# Patient Record
Sex: MALE | Race: WHITE | NOT HISPANIC OR LATINO | Employment: FULL TIME | URBAN - METROPOLITAN AREA
[De-identification: names, ages, dates, MRNs, and addresses within clinical notes are randomized per-mention and may not be internally consistent; named-entity substitution may affect disease eponyms.]

---

## 2017-01-04 ENCOUNTER — HOSPITAL ENCOUNTER (OUTPATIENT)
Facility: MEDICAL CENTER | Age: 37
End: 2017-01-04
Attending: NURSE PRACTITIONER
Payer: MEDICAID

## 2017-01-04 ENCOUNTER — OFFICE VISIT (OUTPATIENT)
Dept: MEDICAL GROUP | Facility: MEDICAL CENTER | Age: 37
End: 2017-01-04
Attending: NURSE PRACTITIONER
Payer: MEDICAID

## 2017-01-04 VITALS
RESPIRATION RATE: 20 BRPM | WEIGHT: 171 LBS | TEMPERATURE: 98.6 F | OXYGEN SATURATION: 95 % | HEIGHT: 70 IN | BODY MASS INDEX: 24.48 KG/M2 | HEART RATE: 68 BPM | DIASTOLIC BLOOD PRESSURE: 70 MMHG | SYSTOLIC BLOOD PRESSURE: 110 MMHG

## 2017-01-04 DIAGNOSIS — H61.22 IMPACTED CERUMEN OF LEFT EAR: ICD-10-CM

## 2017-01-04 DIAGNOSIS — R30.0 DYSURIA: ICD-10-CM

## 2017-01-04 DIAGNOSIS — R51.9 NONINTRACTABLE EPISODIC HEADACHE, UNSPECIFIED HEADACHE TYPE: ICD-10-CM

## 2017-01-04 DIAGNOSIS — D64.9 NORMOCYTIC ANEMIA: ICD-10-CM

## 2017-01-04 DIAGNOSIS — N12 PYELONEPHRITIS: ICD-10-CM

## 2017-01-04 DIAGNOSIS — Z13.21 ENCOUNTER FOR VITAMIN DEFICIENCY SCREENING: ICD-10-CM

## 2017-01-04 DIAGNOSIS — Z87.442 HISTORY OF KIDNEY STONES: ICD-10-CM

## 2017-01-04 DIAGNOSIS — Z13.29 SCREENING FOR THYROID DISORDER: ICD-10-CM

## 2017-01-04 DIAGNOSIS — Z13.220 SCREENING CHOLESTEROL LEVEL: ICD-10-CM

## 2017-01-04 DIAGNOSIS — Z71.6 ENCOUNTER FOR TOBACCO USE CESSATION COUNSELING: ICD-10-CM

## 2017-01-04 LAB
APPEARANCE UR: CLEAR
APPEARANCE UR: CLEAR
BILIRUB UR QL STRIP.AUTO: NEGATIVE
BILIRUB UR STRIP-MCNC: NEGATIVE MG/DL
COLOR UR AUTO: NORMAL
COLOR UR AUTO: YELLOW
CULTURE IF INDICATED INDCX: NO UA CULTURE
GLUCOSE UR STRIP.AUTO-MCNC: NEGATIVE MG/DL
GLUCOSE UR STRIP.AUTO-MCNC: NEGATIVE MG/DL
KETONES UR STRIP.AUTO-MCNC: NEGATIVE MG/DL
KETONES UR STRIP.AUTO-MCNC: NEGATIVE MG/DL
LEUKOCYTE ESTERASE UR QL STRIP.AUTO: NEGATIVE
LEUKOCYTE ESTERASE UR QL STRIP.AUTO: NEGATIVE
MICRO URNS: NORMAL
NITRITE UR QL STRIP.AUTO: NEGATIVE
NITRITE UR QL STRIP.AUTO: NEGATIVE
PH UR STRIP.AUTO: 5 [PH] (ref 5–8)
PH UR: 5.5 [PH]
PROT UR QL STRIP: NEGATIVE MG/DL
PROT UR QL STRIP: NEGATIVE MG/DL
RBC UR QL AUTO: NEGATIVE
RBC UR QL AUTO: NEGATIVE
SP GR UR STRIP.AUTO: 1.01
SP GR UR STRIP.AUTO: 1.03
UROBILINOGEN UR STRIP-MCNC: NEGATIVE MG/DL

## 2017-01-04 PROCEDURE — 99204 OFFICE O/P NEW MOD 45 MIN: CPT | Mod: 25 | Performed by: NURSE PRACTITIONER

## 2017-01-04 PROCEDURE — 87491 CHLMYD TRACH DNA AMP PROBE: CPT

## 2017-01-04 PROCEDURE — 99204 OFFICE O/P NEW MOD 45 MIN: CPT | Performed by: NURSE PRACTITIONER

## 2017-01-04 PROCEDURE — 81003 URINALYSIS AUTO W/O SCOPE: CPT

## 2017-01-04 PROCEDURE — 87591 N.GONORRHOEAE DNA AMP PROB: CPT

## 2017-01-04 PROCEDURE — 81002 URINALYSIS NONAUTO W/O SCOPE: CPT | Performed by: NURSE PRACTITIONER

## 2017-01-04 RX ORDER — BUTALBITAL, ACETAMINOPHEN AND CAFFEINE 50; 325; 40 MG/1; MG/1; MG/1
1-2 TABLET ORAL EVERY 4 HOURS PRN
Qty: 10 TAB | Refills: 0 | Status: SHIPPED | OUTPATIENT
Start: 2017-01-04

## 2017-01-04 ASSESSMENT — PATIENT HEALTH QUESTIONNAIRE - PHQ9: CLINICAL INTERPRETATION OF PHQ2 SCORE: 0

## 2017-01-04 NOTE — ASSESSMENT & PLAN NOTE
Intermittent headaches right sided. Better since leaving hospital.  Fiorcet in hospital helped.  Denies neurological deficits.  CT head in hospital in Dec normal.

## 2017-01-04 NOTE — ASSESSMENT & PLAN NOTE
"Pt reports RUQ and right flank pain intermittent.  Feels similar to previous kidney infection.  Pt w recent Pyelonephritis in hospital in Dec.  Denies fever or chills.  States sometimes gets \"hot flash\".  Denies difficulty urinating. Pt denies known blood in urine or fowl smell. Does report intermittent RUQ and right flank pains.  Denies n/v or feeling ill.  Slight irritation with voiding.  Denies penile discharge.  We discussed referral to Urologist and we reviewed his last CT Abd/Pelvis and Oct 2016 Renal CT Scan.    "

## 2017-01-04 NOTE — ASSESSMENT & PLAN NOTE
Pt has ear wax in left ear  Has had this issue in past.  Reports would like it irrigated out in near future.  Denies pain to ear or drainage.

## 2017-01-04 NOTE — PROGRESS NOTES
"    Chief Complaint: New Patient and recent kidney problems    HPI:  Gage presents to the clinic to establish as a New Patient.    His PMH includes:  Tobacco Use  Headaches  Kidney Stone (probable)    Review of Records shows  12/16---> 12/19/16 Hospital Admit for Pyelonephritis/Sepsis, hypokalemia, Headaches  CT Abd Pelvis showed:   1.  Patchy hypodensities in the right kidney with renal enlargement and significant perinephric stranding. Findings are suggestive of pyelonephritis, but CT appears similar to the prior exam in October. Infiltrative neoplasm is less likely in a patient   of this age, but cannot be excluded.  2.  Mild dilatation of the distal right ureter without obstructing calculus identified.  CT Head - Negative    10/12/16 ER visit for RLQ abd pain, Flank pain, LBp, Dx with hematuria most likely r/t Kidney Stone.    [place]  Report  No entries.    Pyelonephritis  Pt reports RUQ and right flank pain intermittent.  Feels similar to previous kidney infection.  Pt w recent Pyelonephritis in hospital in Dec.  Denies fever or chills.  States sometimes gets \"hot flash\".  Denies difficulty urinating. Pt denies known blood in urine or fowl smell. Does report intermittent RUQ and right flank pains.  Denies n/v or feeling ill.  Slight irritation with voiding.  Denies penile discharge.  We discussed referral to Urologist and we reviewed his last CT Abd/Pelvis and Oct 2016 Renal CT Scan.      History of kidney stones  Pt reports was told in ER in October he probably passed a kidney stone as had dilated Right ureter, but never collected stone.  Reports no kidney issues prior to this.  Recent admit to hospital w kidney infection.  Discussed importance of staying well hydrated and will refer to urology.  Pt reports slight irritation at end of penis with voiding .  No discharge. Intermittent mild RUQ abd pains and right flank pain, none now.    Impacted cerumen of left ear  Pt has ear wax in left ear  Has had " "this issue in past.  Reports would like it irrigated out in near future.  Denies pain to ear or drainage.      Nonintractable episodic headache  Intermittent headaches right sided. Better since leaving hospital.  Fiorcet in hospital helped.  Denies neurological deficits.  CT head in hospital in Dec normal.      Patient Active Problem List    Diagnosis Date Noted   • Pyelonephritis 01/04/2017   • History of kidney stones 01/04/2017   • Impacted cerumen of left ear 01/04/2017   • Nonintractable episodic headache 01/04/2017       Allergies:Review of patient's allergies indicates no known allergies.    Current Outpatient Prescriptions   Medication Sig Dispense Refill   • nicotine (NICODERM) 7 MG/24HR PATCH 24 HR Apply 1 Patch to skin as directed every 24 hours. 30 Patch 1   • carbamide peroxide (CARBAMOXIDE EAR DROPS) 6.5 % Solution Place 5 Drops in left ear 2 times a day. Administer drops in left ear 1 Bottle 0   • acetaminophen/caffeine/butalbital 325-40-50 mg (FIORICET) -40 MG Tab Take 1-2 Tabs by mouth every four hours as needed for Headache. 10 Tab 0   • aspirin (ASA) 325 MG Tab Take 650 mg by mouth every 6 hours as needed for Mild Pain.       No current facility-administered medications for this visit.       Social History   Substance Use Topics   • Smoking status: Current Every Day Smoker -- 0.25 packs/day for 10 years     Types: Cigarettes   • Smokeless tobacco: None   • Alcohol Use: Yes      Comment: rare, Heavy in past, rare past 2 years.       Family History   Problem Relation Age of Onset   • Diabetes Mother    • Hyperlipidemia Mother    • Hyperlipidemia Father        ROS:  Review of Systems   See HPI Above        Exam:  Blood pressure 110/70, pulse 68, temperature 37 °C (98.6 °F), resp. rate 20, height 1.778 m (5' 10\"), weight 77.565 kg (171 lb), SpO2 95 %.  General:  Well nourished, well developed male in NAD  HENT:Head is grossly normal. PERRL. Right ear canal clear and TM normal. Left ear canal " blocked with brown wax.  Neck: Supple. Trachea is midline.  Pulmonary: Clear to ausculation .  Normal effort. No rales, ronchi, or wheezing.   Cardiovascular: Regular rate and rhythm.  Abdomen-Abdomen is soft, No tenderness.  Back- No CVA tenderness bilaterally.  Upper extremities- Strong = . Good ROM  Lower extremities- neg for edema, redness, tenderness.  Neuro- A & O x 4. Speech clear and appropriate.     Current medications, allergies, and problem list reviewed with patient and updated in  EPIC today.    Assessment/Plan:  1. Screening for thyroid disorder  TSH   2. Encounter for vitamin deficiency screening  VITAMIN D,25 HYDROXY    VITAMIN B12   3. Screening cholesterol level  LIPID PROFILE   4. Normocytic anemia  CBC WITH DIFFERENTIAL    IRON/TOTAL IRON BIND    FERRITIN   5. Pyelonephritis  REFERRAL TO UROLOGY    POCT Urinalysis---> All negative.   6. History of kidney stones  REFERRAL TO UROLOGY    URIC ACID   7. Encounter for tobacco use cessation counseling  nicotine (NICODERM) 7 MG/24HR PATCH 24 HR   8. Dysuria  URINALYSIS,CULTURE IF INDICATED    CHLAMYDIA/GC PCR URINE OR SWAB    POCT Urinalysis   9. Impacted cerumen of left ear  carbamide peroxide (CARBAMOXIDE EAR DROPS) 6.5 % Solution  F/u in 1 week here at Clinic for Left Ear Irrigation by Medical Assistant   10. Nonintractable episodic headache, unspecified headache type  acetaminophen/caffeine/butalbital 325-40-50 mg (FIORICET) -40 MG Tab   Follow up in 1 month. Call or return if questions, concerns, or worsening condition.

## 2017-01-04 NOTE — MR AVS SNAPSHOT
"        Gage Meredithcurtis   2017 9:10 AM   Office Visit   MRN: [id number]    Department:  [facility]   Dept Phone:  [phone]    Description:  Male : 1980   Provider:  CAM Schulz           Reason for Visit     New Patient           Allergies as of 2017     No Known Allergies      You were diagnosed with     Screening for thyroid disorder   [V77.0.ICD-9-CM]       Encounter for vitamin deficiency screening   [667708]       Screening cholesterol level   [888715]       Normocytic anemia   [194911]       Pyelonephritis   [204240]       History of kidney stones   [548068]       Encounter for tobacco use cessation counseling   [0633724]       Dysuria   [788.1.ICD-9-CM]         Vital Signs     Blood Pressure Pulse Temperature Respirations Height Weight    110/70 mmHg 68 37 °C (98.6 °F) 20 1.778 m (5' 10\") 77.565 kg (171 lb)    Body Mass Index Oxygen Saturation Smoking Status             24.54 kg/m2 95% Current Every Day Smoker         Basic Information     Date Of Birth Sex Race Ethnicity Preferred Language    1980 Male White Non- English      Your appointments     2017  8:50 AM   Established Patient with CAM Schulz   [facility] ([facility]    [address] [place] [id number] [phone]           You will be receiving a confirmation call a few days before your appointment from our automated call confirmation system.              Problem List              ICD-10-CM Priority Class Noted - Resolved    Pyelonephritis N12   2017 - Present    History of kidney stones Z87.442   2017 - Present      Health Maintenance        Date Due Completion Dates    IMM DTaP/Tdap/Td Vaccine (1 - Tdap) 2001 ---    IMM INFLUENZA (1) 2016 ---            Results     POCT Urinalysis      Component Value Standard Range & Units    POC Color Yellow Negative    POC Appearance Clear Negative    POC Leukocyte Esterase Negative Negative    POC Nitrites " Negative Negative    POC Urobiligen Negative Negative (0.2) mg/dL    POC Protein Negative Negative mg/dL    POC Urine PH 5.0 5.0 - 8.0    POC Blood Negative Negative    POC Specific Gravity 1.030 <1.005 - >1.030    POC Ketones Negative Negative mg/dL    POC Biliruben Negative Negative mg/dL    POC Glucose Negative Negative mg/dL                        Current Immunizations     No immunizations on file.      Below and/or attached are the medications your provider expects you to take. Review all of your home medications and newly ordered medications with your provider and/or pharmacist. Follow medication instructions as directed by your provider and/or pharmacist. Please keep your medication list with you and share with your provider. Update the information when medications are discontinued, doses are changed, or new medications (including over-the-counter products) are added; and carry medication information at all times in the event of emergency situations     Allergies:  No Known Allergies          Medications  Valid as of: January 04, 2017 -  9:41 AM    Generic Name Brand Name Tablet Size Instructions for use    Aspirin (Tab)  MG Take 650 mg by mouth every 6 hours as needed for Mild Pain.        Nicotine (PATCH 24 HR) NICODERM 7 MG/24HR Apply 1 Patch to skin as directed every 24 hours.        .                 Medicines prescribed today were sent to:     None      Medication refill instructions:       If your prescription bottle indicates you have medication refills left, it is not necessary to call your provider’s office. Please contact your pharmacy and they will refill your medication.    If your prescription bottle indicates you do not have any refills left, you may request refills at any time through one of the following ways: The online [employer] system (except Urgent Care), by calling your provider’s office, or by asking your pharmacy to contact your provider’s office with a refill request. Medication  refills are processed only during regular business hours and may not be available until the next business day. Your provider may request additional information or to have a follow-up visit with you prior to refilling your medication.   *Please Note: Medication refills are assigned a new Rx number when refilled electronically. Your pharmacy may indicate that no refills were authorized even though a new prescription for the same medication is available at the pharmacy. Please request the medicine by name with the pharmacy before contacting your provider for a refill.        Your To Do List     Future Labs/Procedures Complete By Expires    CBC WITH DIFFERENTIAL  As directed 1/4/2018    CHLAMYDIA/GC PCR URINE OR SWAB  As directed 1/4/2018    FERRITIN  As directed 1/4/2018    IRON/TOTAL IRON BIND  As directed 1/4/2018    LIPID PROFILE  As directed 1/4/2018    TSH  As directed 1/4/2018    URIC ACID  As directed 1/4/2018    URINALYSIS,CULTURE IF INDICATED  As directed 1/4/2018    VITAMIN B12  As directed 1/4/2018    VITAMIN D,25 HYDROXY  As directed 1/4/2018      Referral     A referral request has been sent to our patient care coordination department. Please allow 3-5 business days for us to process this request and contact you either by phone or mail. If you do not hear from us by the 5th business day, please call us at [phone].           [employer] Access Code: [email or web address]  Expires: 1/13/2017  8:55 AM    [employer]  A secure, online tool to manage your health information     [employer]’s [employer]® is a secure, online tool that connects you to your personalized health information from the privacy of your home -- day or night - making it very easy for you to manage your healthcare. Once the activation process is completed, you can even access your medical information using the [employer] portia, which is available for free in the Apple Portia store or Google Play store.     [employer] provides the following levels of access  (as shown below):   My Chart Features   [facility] Primary [facility] Doctor [facility]  Specialists [facility]  Urgent  Care Non-[facility]  Primary Care  Doctor   Email your healthcare team securely and privately 24/7 X X X    Manage appointments: schedule your next appointment; view details of past/upcoming appointments X      Request prescription refills. X      View recent personal medical records, including lab and immunizations X X X X   View health record, including health history, allergies, medications X X X X   Read reports about your outpatient visits, procedures, consult and ER notes X X X X   See your discharge summary, which is a recap of your hospital and/or ER visit that includes your diagnosis, lab results, and care plan. X X       How to register for [employer] ([employer]):  1. Go to  [email or web address].  2. Click on the Sign Up Now box, which takes you to the New Member Sign Up page. You will need to provide the following information:  a. Enter your [employer] ([employer] Access Code exactly as it appears at the top of this page. (You will not need to use this code after you’ve completed the sign-up process. If you do not sign up before the expiration date, you must request a new code.)   b. Enter your date of birth.   c. Enter your home email address.   d. Click Submit, and follow the next screen’s instructions.  3. Create a [employer] (T[employer]L.) ID. This will be your [employer] ([employer] login ID and cannot be changed, so think of one that is secure and easy to remember.  4. Create a [employer] ([employer] password. You can change your password at any time.  5. Enter your Password Reset Question and Answer. This can be used at a later time if you forget your password.   6. Enter your e-mail address. This allows you to receive e-mail notifications when new information is available in [employer] ([employer]  7. Click Sign Up. You can now view your health information.    For assistance activating your [employer] ([employer] account, call [phone]

## 2017-01-04 NOTE — ASSESSMENT & PLAN NOTE
Pt reports was told in ER in October he probably passed a kidney stone as had dilated Right ureter, but never collected stone.  Reports no kidney issues prior to this.  Recent admit to hospital w kidney infection.  Discussed importance of staying well hydrated and will refer to urology.  Pt reports slight irritation at end of penis with voiding .  No discharge. Intermittent mild RUQ abd pains and right flank pain, none now.

## 2017-01-05 LAB
C TRACH DNA GENITAL QL NAA+PROBE: NEGATIVE
N GONORRHOEA DNA GENITAL QL NAA+PROBE: NEGATIVE
SPECIMEN SOURCE: NORMAL

## 2018-08-03 ENCOUNTER — HOSPITAL ENCOUNTER (EMERGENCY)
Facility: MEDICAL CENTER | Age: 38
End: 2018-08-03
Attending: EMERGENCY MEDICINE

## 2018-08-03 VITALS
DIASTOLIC BLOOD PRESSURE: 81 MMHG | TEMPERATURE: 98 F | HEART RATE: 75 BPM | WEIGHT: 160.5 LBS | RESPIRATION RATE: 16 BRPM | OXYGEN SATURATION: 98 % | BODY MASS INDEX: 23.03 KG/M2 | SYSTOLIC BLOOD PRESSURE: 133 MMHG

## 2018-08-03 DIAGNOSIS — M54.9 PAIN, UPPER BACK: ICD-10-CM

## 2018-08-03 DIAGNOSIS — M54.50 ACUTE LEFT-SIDED LOW BACK PAIN WITHOUT SCIATICA: ICD-10-CM

## 2018-08-03 LAB
APPEARANCE UR: CLEAR
APPEARANCE UR: NORMAL
COLOR UR AUTO: NORMAL
COLOR UR AUTO: YELLOW
GLUCOSE UR QL STRIP.AUTO: NEGATIVE MG/DL
GLUCOSE UR STRIP.AUTO-MCNC: NORMAL MG/DL
KETONES UR QL STRIP.AUTO: NEGATIVE MG/DL
KETONES UR STRIP.AUTO-MCNC: NORMAL MG/DL
LEUKOCYTE ESTERASE UR QL STRIP.AUTO: NEGATIVE
LEUKOCYTE ESTERASE UR QL STRIP.AUTO: NORMAL
NITRITE UR QL STRIP.AUTO: NEGATIVE
NITRITE UR QL STRIP.AUTO: NORMAL
PH UR STRIP.AUTO: 5.5 [PH]
PH UR STRIP.AUTO: 5.5 [PH] (ref 5–8)
PROT UR QL STRIP: NEGATIVE MG/DL
PROT UR QL STRIP: NORMAL MG/DL
RBC UR QL AUTO: NEGATIVE
RBC UR QL AUTO: NORMAL
SP GR UR STRIP.AUTO: 1.02
SP GR UR: 1.02

## 2018-08-03 PROCEDURE — 81002 URINALYSIS NONAUTO W/O SCOPE: CPT | Performed by: EMERGENCY MEDICINE

## 2018-08-03 PROCEDURE — 81002 URINALYSIS NONAUTO W/O SCOPE: CPT

## 2018-08-03 PROCEDURE — 99283 EMERGENCY DEPT VISIT LOW MDM: CPT

## 2018-08-03 NOTE — ED PROVIDER NOTES
"ED Provider Note    Scribed for Shantal Wayne M.D. by Daniella Biswas. 8/3/2018, 4:18 PM.    Primary care provider: CAM Joyner  Means of arrival: Walk-in  History obtained from: Patient  History limited by: None    CHIEF COMPLAINT  Chief Complaint   Patient presents with   • Back Pain     pt reports back pain, started yesterday, mid to lower back. reports that pain is squeezing down to mid thigh. denies specific injury. vague historian       HPI  Gage Roth is a 38 y.o. male who presents to the Emergency Department for evaluation of back pain just under the shoulder blades, onset yesterday. Initially the pain felt like a \"squeezing\" pressure running down his left leg. 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 No urination changes, bowel movement changes, fevers, or vomiting. He felt nauseous after eating cereal this morning. He took 1 Aspirin this morning for pain with good relief of the pain. Patient denies IVDA.    REVIEW OF SYSTEMS  Pertinent positives include back pain, \"squeezing\" pressure running down leg, nausea. Pertinent negatives include no urination changes, bowel movement changes, fevers, vomiting.   See HPI for further details.     PAST MEDICAL HISTORY  None noted    SURGICAL HISTORY  None noted    SOCIAL HISTORY  Social History   Substance Use Topics   • Smoking status: Current Every Day Smoker     Packs/day: 0.25     Years: 10.00     Types: Cigarettes   • Alcohol use Yes      Comment: rare, Heavy in past, rare past 2 years.      History   Drug Use No       FAMILY HISTORY  Family History   Problem Relation Age of Onset   • Diabetes Mother    • Hyperlipidemia Mother    • Hyperlipidemia Father        CURRENT MEDICATIONS  No current facility-administered medications for this encounter.     Current Outpatient Prescriptions:   •  nicotine (NICODERM) 7 MG/24HR PATCH 24 HR, Apply 1 Patch to skin as directed every 24 hours., Disp: 30 Patch, Rfl: 1  •  carbamide peroxide " (CARBAMOXIDE EAR DROPS) 6.5 % Solution, Place 5 Drops in left ear 2 times a day. Administer drops in left ear, Disp: 1 Bottle, Rfl: 0  •  acetaminophen/caffeine/butalbital 325-40-50 mg (FIORICET) -40 MG Tab, Take 1-2 Tabs by mouth every four hours as needed for Headache., Disp: 10 Tab, Rfl: 0  •  aspirin (ASA) 325 MG Tab, Take 650 mg by mouth every 6 hours as needed for Mild Pain., Disp: , Rfl:     ALLERGIES  No Known Allergies    PHYSICAL EXAM  VITAL SIGNS: /75   Pulse 99   Temp 36.1 °C (96.9 °F)   Resp 16   Wt 72.8 kg (160 lb 7.9 oz)   SpO2 98%   BMI 23.03 kg/m²   Vitals reviewed.    Consitutional: Well-developed, well-nourished. Negative for: distress.  HENT: Normocephalic, atraumatic, right external ear normal, left external ear normal, oropharynx clear and moist.  Eyes: Conjunctivae normal, negative for left and right eye discharge.  Neck: Range of motion normal, supple.   Musculoskeletal: Normal range of motion. Normal gait. 5/5 strength to bilateral lower extremities. 2+ bilateral patellar DTRs.   Neurological: Alert and oriented x3. Sensation intact to bilateral lower extremities.   Skin: Warm, dry. Negative for: erythema, rash.  Psych: Mood/affect normal, behavior johanny      DIAGNOSTIC STUDIES / PROCEDURES    LABS  Results for orders placed or performed during the hospital encounter of 08/03/18   POC URINALYSIS   Result Value Ref Range    POC Nitrites neg Negative    POC Color yellow, clear Negative    POC Appearance neg Negative    POC Specific Gravity 1.025 <1.005 - >1.030    POC Urine PH 5.5 5.0 - 8.0    POC Glucose neg Negative mg/dL    POC Ketones neg Negative mg/dL    POC Protein neg Negative mg/dL    POC Leukocyte Esterase neg Negative    POC Blood neg Negative   POC UA   Result Value Ref Range    POC Color Yellow     POC Appearance Clear     POC Glucose Negative Negative mg/dL    POC Ketones Negative Negative mg/dL    POC Specific Gravity 1.025 1.005 - 1.030    POC Blood Negative  Negative    POC Urine PH 5.5 5.0 - 8.0    POC Protein Negative Negative mg/dL    POC Nitrites Negative Negative    POC Leukocyte Esterase Negative Negative   All labs reviewed by me.      COURSE & MEDICAL DECISION MAKING  Nursing notes, VS, PMSFHx reviewed in chart.    4:18 PM - Patient seen and examined at bedside. The patient presents with pain to his shoulder blades with squeezing pressure running down his legs who is neurologically intact, has no clinical signs of epidural abscess, cauda equina syndrome, who has not had any trauma. Ordered for POC Urinalysis, POC UA to evaluate.     5:05 PM - Reviewed lab results as noted above. Urinalysis negative, patient likely has sprained muscles. The patient will be discharged at this time and should return if symptoms worsen or if new symptoms arise. The patient understands and agrees to plan. Vitals at time of discharge are as follows: /81   Pulse 75   Temp 36.7 °C (98 °F) (Temporal)   Resp 16   Wt 72.8 kg (160 lb 7.9 oz)   SpO2 98%   BMI 23.03 kg/m²     Patient has had high blood pressure while in the emergency department, felt likely secondary to medical condition. Counseled patient to monitor blood pressure at home and follow up with primary care physician.       DISPOSITION:  Patient will be discharged home in stable condition.    FOLLOW UP:  CAM Joyner  [address] [place] [address] [id number] [phone]    Call today  for re-check next week      FINAL IMPRESSION  1. Acute left-sided low back pain without sciatica    2. Pain, upper back          Daniella LEONARD (Janes), am scribing for, and in the presence of, Shantal Wayne M.D..    Electronically signed by: Daniella Biswas (Janes), 8/3/2018    Shantal LEONARD M.D. personally performed the services described in this documentation, as scribed by Daniella Biswas in my presence, and it is both accurate and complete.    The note accurately reflects work and decisions made by me.   Shantal Wayne  8/3/2018  6:45 PM

## 2018-08-03 NOTE — DISCHARGE INSTRUCTIONS
Low Back Sprain  A sprain is a stretch or tear in the bands of tissue that hold bones and joints together (ligaments). Sprains of the lower back (lumbar spine) are a common cause of low back pain. A sprain occurs when ligaments are overextended or stretched beyond their limits. The ligaments can become inflamed, resulting in pain and sudden muscle tightening (spasms). A sprain can be caused by an injury (trauma), or it can develop gradually due to overuse.  There are three types of sprains:  · Grade 1 is a mild sprain involving an overstretched ligament or a very slight tear of the ligament.  · Grade 2 is a moderate sprain involving a partial tear of the ligament.  · Grade 3 is a severe sprain involving a complete tear of the ligament.  What are the causes?  This condition may be caused by:  · Trauma, such as a fall or a hit to the body.  · Twisting or overstretching the back. This may result from doing activities that require a lot of energy, such as lifting heavy objects.  What increases the risk?  The following factors may increase your risk of getting this condition:  · Playing contact sports.  · Participating in sports or activities that put excessive stress on the back and require a lot of bending and twisting, including:  ¨ Lifting weights or heavy objects.  ¨ Gymnastics.  ¨ Soccer.  ¨ Figure skating.  ¨ Snowboarding.  · Being overweight or obese.  · Having poor strength and flexibility.  What are the signs or symptoms?  Symptoms of this condition may include:  · Sharp or dull pain in the lower back that does not go away. Pain may extend to the buttocks.  · Stiffness.  · Limited range of motion.  · Inability to stand up straight due to stiffness or pain.  · Muscle spasms.  How is this diagnosed?     This condition may be diagnosed based on:  · Your symptoms.  · Your medical history.  · A physical exam.  ¨ Your health care provider may push on certain areas of your back to determine the source of your  pain.  ¨ You may be asked to bend forward, backward, and side to side to assess the severity of your pain and your range of motion.  · Imaging tests, such as:  ¨ X-rays.  ¨ MRI.  How is this treated?  Treatment for this condition may include:  · Applying heat and cold to the affected area.  · Medicines to help relieve pain and to relax your muscles (muscle relaxants).  · NSAIDs to help reduce swelling and discomfort.  · Physical therapy.  When your symptoms improve, it is important to gradually return to your normal routine as soon as possible to reduce pain, avoid stiffness, and avoid loss of muscle strength. Generally, symptoms should improve within 6 weeks of treatment. However, recovery time varies.  Follow these instructions at home:  Managing pain, stiffness, and swelling  · If directed, apply ice to the injured area during the first 24 hours after your injury.  ¨ Put ice in a plastic bag.  ¨ Place a towel between your skin and the bag.  ¨ Leave the ice on for 20 minutes, 2-3 times a day.  · If directed, apply heat to the affected area as often as told by your health care provider. Use the heat source that your health care provider recommends, such as a moist heat pack or a heating pad.  ¨ Place a towel between your skin and the heat source.  ¨ Leave the heat on for 20-30 minutes.  ¨ Remove the heat if your skin turns bright red. This is especially important if you are unable to feel pain, heat, or cold. You may have a greater risk of getting burned.  Activity  · Rest and return to your normal activities as told by your health care provider. Ask your health care provider what activities are safe for you.  · Avoid activities that take a lot of effort (are strenuous) for as long as told by your health care provider.  · Do exercises as told by your health care provider.  General instructions  · Take over-the-counter and prescription medicines only as told by your health care provider.  · If you have questions or  concerns about safety while taking pain medicine, talk with your health care provider.  · Do not drive or operate heavy machinery until you know how your pain medicine affects you.  · Do not use any tobacco products, such as cigarettes, chewing tobacco, and e-cigarettes. Tobacco can delay bone healing. If you need help quitting, ask your health care provider.  · Keep all follow-up visits as told by your health care provider. This is important.  How is this prevented?  · Warm up and stretch before being active.  · Cool down and stretch after being active.  · Give your body time to rest between periods of activity.  · Avoid:  ¨ Being physically inactive for long periods at a time.  ¨ Exercising or playing sports when you are tired or in pain.  · Use correct form when playing sports and lifting heavy objects.  · Use good posture when sitting and standing.  · Maintain a healthy weight.  · Sleep on a mattress with medium firmness to support your back.  · Make sure to use equipment that fits you, including shoes that fit well.  · Be safe and responsible while being active to avoid falls.  · Do at least 150 minutes of moderate-intensity exercise each week, such as brisk walking or water aerobics. Try a form of exercise that takes stress off your back, such as swimming or stationary cycling.  · Maintain physical fitness, including:  ¨ Strength. In particular, develop and maintain strong abdominal muscles.  ¨ Flexibility.  ¨ Cardiovascular fitness.  ¨ Endurance.  Contact a health care provider if:  · Your back pain does not improve after 6 weeks of treatment.  · Your symptoms get worse.  Get help right away if:  · Your back pain is severe.  · You are unable to stand or walk.  · You develop pain in your legs.  · You develop weakness in your buttocks or legs.  · You have difficulty controlling when you urinate or when you have a bowel movement.  This information is not intended to replace advice given to you by your health  care provider. Make sure you discuss any questions you have with your health care provider.  Document Released: 12/18/2006 Document Revised: 08/24/2017 Document Reviewed: 09/28/2016  Elsevier Interactive Patient Education © 2017 [employer]

## 2018-08-03 NOTE — ED TRIAGE NOTES
Chief Complaint   Patient presents with   • Back Pain     pt reports back pain, started yesterday, mid to lower back. reports that pain is squeezing down to mid thigh. denies specific injury. vague historian     Blood pressure 118/75, pulse 99, temperature 36.1 °C (96.9 °F), resp. rate 16, weight 72.8 kg (160 lb 7.9 oz), SpO2 98 %.    Pt informed of wait times. Educated on triage process.  Asked to return to triage RN for any new or worsening of symptoms. Thanked for patience.

## 2019-12-26 ENCOUNTER — APPOINTMENT (OUTPATIENT)
Dept: RADIOLOGY | Facility: MEDICAL CENTER | Age: 39
End: 2019-12-26
Attending: EMERGENCY MEDICINE

## 2019-12-26 ENCOUNTER — HOSPITAL ENCOUNTER (EMERGENCY)
Facility: MEDICAL CENTER | Age: 39
End: 2019-12-26
Attending: EMERGENCY MEDICINE

## 2019-12-26 VITALS
OXYGEN SATURATION: 99 % | BODY MASS INDEX: 24.32 KG/M2 | DIASTOLIC BLOOD PRESSURE: 85 MMHG | TEMPERATURE: 97.3 F | WEIGHT: 173.72 LBS | HEART RATE: 72 BPM | SYSTOLIC BLOOD PRESSURE: 122 MMHG | RESPIRATION RATE: 17 BRPM | HEIGHT: 71 IN

## 2019-12-26 DIAGNOSIS — R10.11 RIGHT UPPER QUADRANT ABDOMINAL PAIN: ICD-10-CM

## 2019-12-26 LAB
ALBUMIN SERPL BCP-MCNC: 4.5 G/DL (ref 3.2–4.9)
ALBUMIN/GLOB SERPL: 1.9 G/DL
ALP SERPL-CCNC: 51 U/L (ref 30–99)
ALT SERPL-CCNC: 24 U/L (ref 2–50)
ANION GAP SERPL CALC-SCNC: 9 MMOL/L (ref 0–11.9)
APPEARANCE UR: CLEAR
AST SERPL-CCNC: 20 U/L (ref 12–45)
BASOPHILS # BLD AUTO: 0.8 % (ref 0–1.8)
BASOPHILS # BLD: 0.06 K/UL (ref 0–0.12)
BILIRUB SERPL-MCNC: 0.4 MG/DL (ref 0.1–1.5)
BILIRUB UR QL STRIP.AUTO: NEGATIVE
BUN SERPL-MCNC: 14 MG/DL (ref 8–22)
CALCIUM SERPL-MCNC: 9.4 MG/DL (ref 8.5–10.5)
CHLORIDE SERPL-SCNC: 108 MMOL/L (ref 96–112)
CO2 SERPL-SCNC: 24 MMOL/L (ref 20–33)
COLOR UR: YELLOW
CREAT SERPL-MCNC: 0.94 MG/DL (ref 0.5–1.4)
EOSINOPHIL # BLD AUTO: 0.24 K/UL (ref 0–0.51)
EOSINOPHIL NFR BLD: 3.3 % (ref 0–6.9)
ERYTHROCYTE [DISTWIDTH] IN BLOOD BY AUTOMATED COUNT: 43.4 FL (ref 35.9–50)
GLOBULIN SER CALC-MCNC: 2.4 G/DL (ref 1.9–3.5)
GLUCOSE SERPL-MCNC: 104 MG/DL (ref 65–99)
GLUCOSE UR STRIP.AUTO-MCNC: NEGATIVE MG/DL
HCT VFR BLD AUTO: 44.6 % (ref 42–52)
HGB BLD-MCNC: 15.1 G/DL (ref 14–18)
IMM GRANULOCYTES # BLD AUTO: 0.08 K/UL (ref 0–0.11)
IMM GRANULOCYTES NFR BLD AUTO: 1.1 % (ref 0–0.9)
KETONES UR STRIP.AUTO-MCNC: NEGATIVE MG/DL
LEUKOCYTE ESTERASE UR QL STRIP.AUTO: NEGATIVE
LIPASE SERPL-CCNC: 22 U/L (ref 11–82)
LYMPHOCYTES # BLD AUTO: 1.54 K/UL (ref 1–4.8)
LYMPHOCYTES NFR BLD: 21.1 % (ref 22–41)
MCH RBC QN AUTO: 31.1 PG (ref 27–33)
MCHC RBC AUTO-ENTMCNC: 33.9 G/DL (ref 33.7–35.3)
MCV RBC AUTO: 92 FL (ref 81.4–97.8)
MICRO URNS: NORMAL
MONOCYTES # BLD AUTO: 0.42 K/UL (ref 0–0.85)
MONOCYTES NFR BLD AUTO: 5.8 % (ref 0–13.4)
NEUTROPHILS # BLD AUTO: 4.95 K/UL (ref 1.82–7.42)
NEUTROPHILS NFR BLD: 67.9 % (ref 44–72)
NITRITE UR QL STRIP.AUTO: NEGATIVE
NRBC # BLD AUTO: 0 K/UL
NRBC BLD-RTO: 0 /100 WBC
PH UR STRIP.AUTO: 5.5 [PH] (ref 5–8)
PLATELET # BLD AUTO: 272 K/UL (ref 164–446)
PMV BLD AUTO: 8.5 FL (ref 9–12.9)
POTASSIUM SERPL-SCNC: 4.3 MMOL/L (ref 3.6–5.5)
PROT SERPL-MCNC: 6.9 G/DL (ref 6–8.2)
PROT UR QL STRIP: NEGATIVE MG/DL
RBC # BLD AUTO: 4.85 M/UL (ref 4.7–6.1)
RBC UR QL AUTO: NEGATIVE
SODIUM SERPL-SCNC: 141 MMOL/L (ref 135–145)
SP GR UR STRIP.AUTO: 1.01
UROBILINOGEN UR STRIP.AUTO-MCNC: 0.2 MG/DL
WBC # BLD AUTO: 7.3 K/UL (ref 4.8–10.8)

## 2019-12-26 PROCEDURE — 36415 COLL VENOUS BLD VENIPUNCTURE: CPT

## 2019-12-26 PROCEDURE — 85025 COMPLETE CBC W/AUTO DIFF WBC: CPT

## 2019-12-26 PROCEDURE — 83690 ASSAY OF LIPASE: CPT

## 2019-12-26 PROCEDURE — 96374 THER/PROPH/DIAG INJ IV PUSH: CPT

## 2019-12-26 PROCEDURE — 96375 TX/PRO/DX INJ NEW DRUG ADDON: CPT

## 2019-12-26 PROCEDURE — 700111 HCHG RX REV CODE 636 W/ 250 OVERRIDE (IP): Performed by: EMERGENCY MEDICINE

## 2019-12-26 PROCEDURE — 80053 COMPREHEN METABOLIC PANEL: CPT

## 2019-12-26 PROCEDURE — 71045 X-RAY EXAM CHEST 1 VIEW: CPT

## 2019-12-26 PROCEDURE — 99285 EMERGENCY DEPT VISIT HI MDM: CPT

## 2019-12-26 PROCEDURE — 81003 URINALYSIS AUTO W/O SCOPE: CPT

## 2019-12-26 PROCEDURE — 76705 ECHO EXAM OF ABDOMEN: CPT

## 2019-12-26 RX ORDER — KETOROLAC TROMETHAMINE 30 MG/ML
15 INJECTION, SOLUTION INTRAMUSCULAR; INTRAVENOUS ONCE
Status: COMPLETED | OUTPATIENT
Start: 2019-12-26 | End: 2019-12-26

## 2019-12-26 RX ORDER — HYDROCODONE BITARTRATE AND ACETAMINOPHEN 5; 325 MG/1; MG/1
1 TABLET ORAL EVERY 6 HOURS PRN
Qty: 10 TAB | Refills: 0 | Status: SHIPPED | OUTPATIENT
Start: 2019-12-26 | End: 2019-12-30

## 2019-12-26 RX ORDER — MORPHINE SULFATE 4 MG/ML
4 INJECTION, SOLUTION INTRAMUSCULAR; INTRAVENOUS ONCE
Status: COMPLETED | OUTPATIENT
Start: 2019-12-26 | End: 2019-12-26

## 2019-12-26 RX ORDER — ONDANSETRON 2 MG/ML
4 INJECTION INTRAMUSCULAR; INTRAVENOUS ONCE
Status: COMPLETED | OUTPATIENT
Start: 2019-12-26 | End: 2019-12-26

## 2019-12-26 RX ADMIN — KETOROLAC TROMETHAMINE 15 MG: 30 INJECTION, SOLUTION INTRAMUSCULAR at 08:37

## 2019-12-26 RX ADMIN — ONDANSETRON 4 MG: 2 INJECTION INTRAMUSCULAR; INTRAVENOUS at 08:37

## 2019-12-26 RX ADMIN — MORPHINE SULFATE 4 MG: 4 INJECTION INTRAVENOUS at 09:37

## 2019-12-26 NOTE — ED PROVIDER NOTES
"ED Provider Note    Scribed for Lalito Donato M.D. by Sky Abad. 12/26/2019, 8:23 AM.    Primary care provider: None noted.   Means of arrival: Walk in  History obtained from: Patient  History limited by: None    CHIEF COMPLAINT  Chief Complaint   Patient presents with   • RUQ Pain     onset Sunday evening, constant, \"feels like the food isn't digesting and I'm bloated\", denies N/V   • Lightheadedness       HPI  Gage Roth is a 39 y.o. male who presents to the Emergency Department for evaluation of right upper quadrant pain onset 4 days. He admits to additional symptoms of lower back pain, bloating and lightheadedness, but denies diarrhea, nausea or vomiting. He states the pain is not exacerbated after eating, but says his stomach \"gets tight when I eat\". He is requesting medication for pain at this time. He denies family history of gal bladder disease.     REVIEW OF SYSTEMS  Pertinent positives include right upper quadrant pain, lower back pain, bloating and lightheadedness. Pertinent negatives include no diarrhea, nausea or vomiting. As above, all other systems reviewed and are negative.   See HPI for further details.     PAST MEDICAL HISTORY   None pertinent     SURGICAL HISTORY  patient denies any surgical history    SOCIAL HISTORY  Social History     Tobacco Use   • Smoking status: Current Every Day Smoker     Packs/day: 0.25     Years: 10.00     Pack years: 2.50     Types: Cigarettes   Substance Use Topics   • Alcohol use: Yes     Comment: rare, Heavy in past, rare past 2 years.   • Drug use: No      Social History     Substance and Sexual Activity   Drug Use No       FAMILY HISTORY  Family History   Problem Relation Age of Onset   • Diabetes Mother    • Hyperlipidemia Mother    • Hyperlipidemia Father        CURRENT MEDICATIONS  Current Outpatient Medications:   •  nicotine (NICODERM) 7 MG/24HR PATCH 24 HR, Apply 1 Patch to skin as directed every 24 hours., Disp: 30 Patch, Rfl: 1  •  " "carbamide peroxide (CARBAMOXIDE EAR DROPS) 6.5 % Solution, Place 5 Drops in left ear 2 times a day. Administer drops in left ear, Disp: 1 Bottle, Rfl: 0  •  acetaminophen/caffeine/butalbital 325-40-50 mg (FIORICET) -40 MG Tab, Take 1-2 Tabs by mouth every four hours as needed for Headache., Disp: 10 Tab, Rfl: 0  •  aspirin (ASA) 325 MG Tab, Take 650 mg by mouth every 6 hours as needed for Mild Pain., Disp: , Rfl:      ALLERGIES  No Known Allergies    PHYSICAL EXAM  VITAL SIGNS: /83   Pulse 89   Temp 36.3 °C (97.3 °F) (Oral)   Resp 18   Ht 1.803 m (5' 11\")   Wt 78.8 kg (173 lb 11.6 oz)   SpO2 98%   BMI 24.23 kg/m²   Vitals reviewed.  Constitutional: Alert in no apparent distress.  HENT: No signs of trauma, Bilateral external ears normal, Nose normal.   Eyes: Pupils are equal and reactive, Conjunctiva normal, Non-icteric.   Neck: Normal range of motion, No tenderness, Supple, No stridor.   Lymphatic: No lymphadenopathy noted.   Cardiovascular: Regular rate and rhythm, no murmurs.   Thorax & Lungs: Normal breath sounds, No respiratory distress, No wheezing, No chest tenderness.   Abdomen: Tenderness to right upper quadrant. Bowel sounds normal, Soft, No peritoneal signs, No masses, No pulsatile masses.   Skin: Warm, Dry, No erythema, No rash.   Back: No bony tenderness, No CVA tenderness.   Extremities: Intact distal pulses, No edema, No tenderness, No cyanosis  Musculoskeletal: Good range of motion in all major joints. No tenderness to palpation or major deformities noted.   Neurologic: Alert , Normal motor function, Normal sensory function, No focal deficits noted.   Psychiatric: Affect normal, Judgment normal, Mood normal.     DIAGNOSTIC STUDIES / PROCEDURES    LABS  Labs Reviewed   CBC WITH DIFFERENTIAL - Abnormal; Notable for the following components:       Result Value    MPV 8.5 (*)     Lymphocytes 21.10 (*)     Immature Granulocytes 1.10 (*)     All other components within normal limits "   COMP METABOLIC PANEL - Abnormal; Notable for the following components:    Glucose 104 (*)     All other components within normal limits   LIPASE   URINALYSIS,CULTURE IF INDICATED   ESTIMATED GFR      All labs reviewed by me.    RADIOLOGY  DX-CHEST-PORTABLE (1 VIEW)   Final Result      No pulmonary consolidation.      US-RUQ   Final Result      1.  No acute sonographic abnormality. No gallstones.   2.  Simple 1.4 cm hepatic cyst.        The radiologist's interpretation of all radiological studies have been reviewed by me.    COURSE & MEDICAL DECISION MAKING  Nursing notes, VS, PMSFHx reviewed in chart.  Differential diagnoses include but not limited to: gallbladder disease, pancreatitis.      8:23 AM Patient seen and examined at bedside. I informed the patient of my plan to run diagnostic studies to evaluate their symptoms including an ultrasound and blood work. Patient verbalizes understanding and support with my plan of care.  Ordered for US-RUQ, CBC with diff, CMP, Lipase, UA Culture if indicated to evaluate, eGFR. Patient will be treated with Toradol 15 mg injection, Zofran 4 mg injection  for his symptoms.      9:31 AM - I reevaluated the patient at bedside. I discussed the patient's diagnostic study results which show no acute sonographic abnormality and no gallstones. The patient now reports pain in his rib area. He denies history of similar pain. Ordered DX-Chest to evaluate. The patient will be treated with morphine 4 mg injection.     12:00 PM - I reevaluated the patient at bedside. The patient informs me they feel improved following morphine administration. I discussed the patient's diagnostic study results which show no abnormality. I recommended the patient establish with a primary care physician at [facility] or [facility] to receive further evaluation possibly with a HIDA Scan if the symptoms do not resolve, he also possibly has pleurisy.The patient verbalizes they feel comfortable going home. The  patient is stable for discharge at this time and will return for any new or worsening symptoms. I discussed plan for discharge and follow up as outlined below. Patient verbalizes understanding and support with my plan for discharge.     The patient will not drink alcohol nor drive with prescribed medications. The patient will return for worsening symptoms and is stable at the time of discharge. The patient verbalizes understanding and will comply.     @HTN/IDDM FOLLOW UP:  The patient is referred to a primary physician for blood pressure management, diabetic screening, and for all other preventive health concerns@    I reviewed prescription monitoring program for patient's narcotic use before prescribing a scheduled drug.The patient will not drink alcohol nor drive with prescribed medications. The patient will return for new or worsening symptoms and is stable at the time of discharge.    The patient is referred to a primary physician for blood pressure management, diabetic screening, and for all other preventative health concerns.    In prescribing controlled substances to this patient, I certify that I have obtained and reviewed the medical history of Gage Roth. I have also made a good vaishali effort to obtain applicable records from other providers who have treated the patient and records did not demonstrate any increased risk of substance abuse that would prevent me from prescribing controlled substances.     I have conducted a physical exam and documented it. I have reviewed Mr. Roth’s prescription history as maintained by the [place] Prescription Monitoring Program.     I have assessed the patient’s risk for abuse, dependency, and addiction using the validated Opioid Risk Tool available at https://www.mdcalc.com/lntqtp-gxiu-yhfy-ort-narcotic-abuse.     Given the above, I believe the benefits of controlled substance therapy outweigh the risks. The reasons for prescribing controlled substances include  non-narcotic, oral analgesic alternatives have been inadequate for pain control. Accordingly, I have discussed the risk and benefits, treatment plan, and alternative therapies with the patient.        DISPOSITION:  Patient will be discharged home in stable condition.    FOLLOW UP:  [facility], Emergency Dept  [address] [facility] [address] [phone]    If symptoms worsen    [facility]  [address] [address]  [phone]    call for follow up to establish a primary care doctor if you do not already have one      OUTPATIENT MEDICATIONS:  Discharge Medication List as of 12/26/2019 12:06 PM      START taking these medications    Details   HYDROcodone-acetaminophen (NORCO) 5-325 MG Tab per tablet Take 1 Tab by mouth every 6 hours as needed for up to 4 days., Disp-10 Tab, R-0, Print Rx Paper              FINAL IMPRESSION  1. Right upper quadrant abdominal pain          Sky LEONARD (Scribcecilio), am scribing for, and in the presence of, Lalito Donato M.D..    Electronically signed by: Sky Abad (Davidibcecilio), 12/26/2019. Lalito CRENSHAW M.D. personally performed the services described in this documentation, as scribed by Sky Abad in my presence, and it is both accurate and complete.    The note accurately reflects work and decisions made by me.  Lalito Donato  12/26/2019  12:17 PM

## 2019-12-26 NOTE — ED TRIAGE NOTES
"Gage Roth 39 y.o. male ambulatory to triage for     Chief Complaint   Patient presents with   • RUQ Pain     onset Sunday evening, constant, \"feels like the food isn't digesting and I'm bloated\", denies N/V   • Lightheadedness     Pt denies urinary symptoms.  Eating and drinking w/o problem but feeling more full/bloated than normal.  Pt in NAD.  /83   Pulse 89   Temp 36.3 °C (97.3 °F) (Oral)   Resp 18   Ht 1.803 m (5' 11\")   Wt 78.8 kg (173 lb 11.6 oz)   SpO2 98%   BMI 24.23 kg/m²  Returned to the lobby to await bed assignment.  Advised to return to the triage desk for any changes/concerns.    "

## 2019-12-26 NOTE — DISCHARGE INSTRUCTIONS
Abdominal Pain, Adult  Many things can cause belly (abdominal) pain. Most times, belly pain is not dangerous. Many cases of belly pain can be watched and treated at home. Sometimes belly pain is serious, though. Your doctor will try to find the cause of your belly pain.  Follow these instructions at home:  · Take over-the-counter and prescription medicines only as told by your doctor. Do not take medicines that help you poop (laxatives) unless told to by your doctor.  · Drink enough fluid to keep your pee (urine) clear or pale yellow.  · Watch your belly pain for any changes.  · Keep all follow-up visits as told by your doctor. This is important.  Contact a doctor if:  · Your belly pain changes or gets worse.  · You are not hungry, or you lose weight without trying.  · You are having trouble pooping (constipated) or have watery poop (diarrhea) for more than 2-3 days.  · You have pain when you pee or poop.  · Your belly pain wakes you up at night.  · Your pain gets worse with meals, after eating, or with certain foods.  · You are throwing up and cannot keep anything down.  · You have a fever.  Get help right away if:  · Your pain does not go away as soon as your doctor says it should.  · You cannot stop throwing up.  · Your pain is only in areas of your belly, such as the right side or the left lower part of the belly.  · You have bloody or black poop, or poop that looks like tar.  · You have very bad pain, cramping, or bloating in your belly.  · You have signs of not having enough fluid or water in your body (dehydration), such as:  ¨ Dark pee, very little pee, or no pee.  ¨ Cracked lips.  ¨ Dry mouth.  ¨ Sunken eyes.  ¨ Sleepiness.  ¨ Weakness.  This information is not intended to replace advice given to you by your health care provider. Make sure you discuss any questions you have with your health care provider.  Document Released: 06/05/2009 Document Revised: 07/07/2017 Document Reviewed: 05/31/2017  [employer]  Interactive Patient Education © 2017 [employer]